# Patient Record
Sex: MALE | Race: WHITE
[De-identification: names, ages, dates, MRNs, and addresses within clinical notes are randomized per-mention and may not be internally consistent; named-entity substitution may affect disease eponyms.]

---

## 2017-04-15 ENCOUNTER — HOSPITAL ENCOUNTER (EMERGENCY)
Dept: HOSPITAL 80 - FED | Age: 22
Discharge: HOME | End: 2017-04-15
Payer: COMMERCIAL

## 2017-04-15 VITALS
SYSTOLIC BLOOD PRESSURE: 147 MMHG | OXYGEN SATURATION: 92 % | TEMPERATURE: 99.3 F | DIASTOLIC BLOOD PRESSURE: 78 MMHG | HEART RATE: 105 BPM

## 2017-04-15 VITALS — RESPIRATION RATE: 18 BRPM
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
## 2017-04-15 DIAGNOSIS — S83.014A: Primary | ICD-10-CM

## 2017-04-15 PROCEDURE — 0QSDXZZ REPOSITION RIGHT PATELLA, EXTERNAL APPROACH: ICD-10-PCS | Performed by: EMERGENCY MEDICINE

## 2017-04-15 PROCEDURE — L1830 KO IMMOB CANVAS LONG PRE OTS: HCPCS

## 2017-04-15 NOTE — EDPHY
H & P


Stated Complaint: Right Knee Dislocation


Time Seen by Provider: 04/15/17 00:15


HPI/ROS: 





Chief Complaint:  Right knee pain





HPI:  22-year-old male was at a club dancing tonight when he twisted his leg 

and felt a pop in his right knee with severe pain. He noticed significant 

deformity and.  That his kneecap was out of place.  He has been unable to walk 

since.  He has been drinking several beers this evening.  Last was about 45 

minutes prior to arrival.  Patient also last ate about 4 hours ago.  Denies 

prior injuries.  Did not fall.  Did not hit his head.





ROS:  10 point Review of Systems is negative except as noted in the HPI.





PMH:  None


Medications:  None


Allergies:  None





Social History: No smoking, occasional alcohol,  no recreational drug use





Family History: non-contributory





Physical Exam:


Gen: Awake, Alert, No Distress


HEENT:  


     Nose: no rhinorrhea


     Eyes: PERRLA, EOMI


     Mouth: Moist mucosa 


Neck: Supple, no JVD


Chest: nontender, lungs clear to auscultation


Heart: S1, S2 normal, no murmur


Abd: Soft, non-tender, no guarding


Back: no CVA tenderness, no midline tenderness 


Ext: no edema, right knee is held in flexion.  There is a marked deformity of 

his patella consistent with patellar dislocation.  DP and PT pulses are normal.


Skin: no rash


Neuro: CN II-XII intact, Sensation grossly intact, Strength 5/5 in bilateral 

upper and lower extremities








- Personal History


Current Tetanus Diphtheria and Acellular Pertussis (TDAP): Yes





- Medical/Surgical History


Hx Asthma: No


Hx Chronic Respiratory Disease: No


Hx Diabetes: No


Hx Cardiac Disease: No


Hx Renal Disease: No


Hx Cirrhosis: No


Hx Alcoholism: No


Hx HIV/AIDS: No


Hx Splenectomy or Spleen Trauma: No


Other PMH: adhd,pneumonia, DEPRESSION, anxiety, bipolar





- Social History


Smoking Status: Never smoked


Constitutional: 


 Initial Vital Signs











Temperature (C)  36.7 C   04/15/17 00:10


 


Heart Rate  87   04/15/17 00:10


 


Respiratory Rate  20   04/15/17 00:10


 


Blood Pressure  122/87 H  04/15/17 00:10


 


O2 Sat (%)  97   04/15/17 00:10








 











O2 Delivery Mode               Room Air














Allergies/Adverse Reactions: 


 





No Known Allergies Allergy (Unverified 02/09/16 17:10)


 








Home Medications: 














 Medication  Instructions  Recorded


 


buPROPion XL [Wellbutrin 150mg XL] 300 mg PO DAILY 06/26/16


 


traZODone [traZODONE 100MG (*)] 100 mg PO HS 06/26/16


 


Dextroamphetamine Sulfate 10 mg PO TID 06/27/16





[Dexedrine]  


 


PARoxetine CR [Paxil Cr 25mg (*)] 25 mg PO DAILY 06/27/16


 


PARoxetine HCL [Paxil Cr] 12.5 mg PO DAILY 06/27/16














Medical Decision Making


Procedures: 





Procedure:  Procedural sedation.





A pre-sedation evaluation was completed on the patient at 0100.  Patient is an 

appropriate candidate for procedural sedation.  The risks of the sedation were 

discussed with the patient.  A time out was completed.  The patient was sedated 

with ketamine, 100 mg.  The patient was monitored with continuous pulse 

oximetry and EKG monitor.  There were no complications and no significant 

hypoxemia.  I remained at the bedside for the sedation.  The total time I spent 

in the procedural sedation was 20 minutes.





Procedure:  Dislocation reduction.





The dislocation of the right patella was reduced using leg extension and medial 

pressure technique without complications.  Post reduction the patient's 

neurovascular exam is normal.Post reduction x-ray demonstrates reduction of the 

joint to the anatomic position.


The procedure was performed by myself.








ED Course/Re-evaluation: 





Patient tolerated sedation without any complications.  Postreduction x-rays 

revealed no abnormalities.  He was placed in a knee immobilizer and given 

crutches and instructions to follow up with Orthopedics next week.





- Data Points


Medications Given: 


 








Discontinued Medications





Fentanyl (Sublimaze)  100 mcg IVP EDNOW ONE


   Stop: 04/15/17 00:33


   Last Admin: 04/15/17 00:38 Dose:  100 mcg


Ondansetron HCl (Zofran)  4 mg IVP EDNOW ONE


   Stop: 04/15/17 00:33


   Last Admin: 04/15/17 00:39 Dose:  4 mg








Departure





- Departure


Disposition: Home, Routine, Self-Care


Clinical Impression: 


 Patellar dislocation





Condition: Good


Instructions:  Patellar Dislocation (ED), Knee Immobilizer (ED), Crutch 

Instructions (ED)


Additional Instructions: 


Follow up with Orthopedics in 3-4 days for re-evaluation.


Return to the emergency depart for increasing pain, numbness, tingling, or any 

other concerns.


Referrals: 


Patient,NotPresent [Unknown] - As per Instructions


Herbert Martinez MD [Medical Doctor] - As per Instructions

## 2017-09-09 ENCOUNTER — HOSPITAL ENCOUNTER (EMERGENCY)
Dept: HOSPITAL 80 - FED | Age: 22
Discharge: HOME | End: 2017-09-09
Payer: COMMERCIAL

## 2017-09-09 VITALS
RESPIRATION RATE: 16 BRPM | TEMPERATURE: 97.9 F | DIASTOLIC BLOOD PRESSURE: 96 MMHG | SYSTOLIC BLOOD PRESSURE: 158 MMHG | HEART RATE: 118 BPM

## 2017-09-09 VITALS — OXYGEN SATURATION: 97 %

## 2017-09-09 DIAGNOSIS — L03.011: Primary | ICD-10-CM

## 2017-09-09 NOTE — EDPHY
H & P


Stated Complaint: right pinky infection- injury on 9/5/17


Time Seen by Provider: 09/09/17 21:38


HPI/ROS: 





Chief complaint:  Right pinky finger infection





History of present illness:  This is a 22-year-old male who presents to the 

emergency department concerned he has a right pinky finger injury.  Patient 

states approximately 3 days ago he tripped and fell and cut the pad of his 

pinky finger.  He cleaned off and put Neosporin on it.  However now she is 

developing some pustular discharge on the wound.  He is concerned is becoming 

infected.  He denies actual pain.  He is still flex and extend the finger well.

  No abnormal coolness or paresthesias in the finger.  No other injuries 

reported.  His tetanus is up-to-date.





- Personal History


Current Tetanus Diphtheria and Acellular Pertussis (TDAP): Yes





- Medical/Surgical History


Hx Asthma: No


Hx Chronic Respiratory Disease: No


Hx Diabetes: No


Hx Cardiac Disease: No


Hx Renal Disease: No


Hx Cirrhosis: No


Hx Alcoholism: No


Hx HIV/AIDS: No


Hx Splenectomy or Spleen Trauma: No


Other PMH: adhd,pneumonia, DEPRESSION, anxiety, bipolar





- Social History


Smoking Status: Never smoked





- Physical Exam


Exam: 





General:  Alert, nontoxic


Skin:  There is an abrasion to the right 5th finger.  There is pustular 

discharge on top of the wound.  However the underlying tissue is without 

erythema or edema.  There is no surrounding erythema or edema.  No red 

streaking up the finger.


Musculoskeletal:  He is flexing and extending the right 5th finger in the DIP, 

PIP and MCP joint well.


Vascular:  Capillary refill brisk in the right 5th finger


Neurologic:  Sensation intact in the right 5th finger


Constitutional: 


 Initial Vital Signs











Temperature (C)  37.6 C   09/09/17 21:28


 


Heart Rate  130 H  09/09/17 21:28


 


Respiratory Rate  20   09/09/17 21:28


 


Blood Pressure  161/104 H  09/09/17 21:28


 


O2 Sat (%)  97   09/09/17 21:28








 











O2 Delivery Mode               Room Air














Allergies/Adverse Reactions: 


 





No Known Allergies Allergy (Unverified 09/09/17 21:27)


 








Home Medications: 














 Medication  Instructions  Recorded


 


buPROPion XL [Wellbutrin 150mg XL] 300 mg PO DAILY 06/26/16


 


traZODone [traZODONE 100MG (*)] 100 mg PO HS 06/26/16


 


Dextroamphetamine Sulfate 10 mg PO TID 06/27/16





[Dexedrine]  


 


PARoxetine CR [Paxil Cr 25mg (*)] 25 mg PO DAILY 06/27/16


 


PARoxetine HCL [Paxil Cr] 12.5 mg PO DAILY 06/27/16


 


Cephalexin [Keflex] 500 mg PO QID 6 Days  cap 09/09/17














Medical Decision Making


ED Course/Re-evaluation: 





Patient seen under the supervision of my secondary supervising physician Dr. Laughlin McCollester.  Patient presents to the emergency department for 

evaluation of a wound to his left 5th finger which he is concerned is getting 

infected.  It does appear to be a superficial wound. It has been cleaned and 

dressed.  I have discussed topical care.  I will place him on a course of 

antibiotic to ensure it does not progress.  Home care is discussed.  Return 

precautions are given.  Patient voiced understanding and agreement with plan.


Differential Diagnosis: 





Included but not limited to superficial infection, cellulitis, abscess, 

infectious tenosynovitis, lymphangitis





- Data Points


Medications Given: 


 








Discontinued Medications





Cephalexin (Keflex 500 Mg Prepack#4)  [facility]


   PRN Reason: Protocol


   Stop: 09/09/17 22:09


   Last Admin: 09/09/17 22:20 Dose:  1 btl








Departure





- Departure


Disposition: Home, Routine, Self-Care


Clinical Impression: 


Cellulitis, finger


Qualifiers:


 Laterality: right Qualified Code(s): L03.011 - Cellulitis of right finger





Condition: Good


Instructions:  Cephalexin (By mouth), Cellulitis (ED), Acute Wounds (ED)


Additional Instructions: 


Follow-up with a primary care doctor for recheck





Take antibiotics as prescribed until finished even if feeling better





Keep wound clean with soap and water and apply Neosporin 2-3 times daily





If symptoms worsen or new symptoms develop return to the emergency room for 

recheck


Referrals: 


NONE *PRIMARY CARE P,. [Primary Care Provider] - As per Instructions


Anabella Prado MD [[facility] Primary Care Provider] - As per Instructions


Prescriptions: 


Cephalexin [Keflex] 500 mg PO QID 6 Days  cap

## 2018-04-16 ENCOUNTER — HOSPITAL ENCOUNTER (OUTPATIENT)
Dept: HOSPITAL 80 - FIMAGING | Age: 23
Discharge: HOME | End: 2018-04-16
Attending: INTERNAL MEDICINE
Payer: COMMERCIAL

## 2018-04-16 DIAGNOSIS — A69.22: Primary | ICD-10-CM

## 2018-04-16 DIAGNOSIS — Z79.2: ICD-10-CM

## 2018-04-16 PROCEDURE — 36569 INSJ PICC 5 YR+ W/O IMAGING: CPT

## 2018-04-16 PROCEDURE — 77001 FLUOROGUIDE FOR VEIN DEVICE: CPT

## 2018-04-16 PROCEDURE — C1751 CATH, INF, PER/CENT/MIDLINE: HCPCS

## 2018-04-16 PROCEDURE — 02HV33Z INSERTION OF INFUSION DEVICE INTO SUPERIOR VENA CAVA, PERCUTANEOUS APPROACH: ICD-10-PCS

## 2018-06-09 ENCOUNTER — HOSPITAL ENCOUNTER (EMERGENCY)
Dept: HOSPITAL 80 - FED | Age: 23
Discharge: HOME | End: 2018-06-09
Payer: COMMERCIAL

## 2018-06-09 VITALS — SYSTOLIC BLOOD PRESSURE: 136 MMHG | DIASTOLIC BLOOD PRESSURE: 77 MMHG

## 2018-06-09 DIAGNOSIS — S01.01XA: Primary | ICD-10-CM

## 2018-06-09 DIAGNOSIS — F10.920: ICD-10-CM
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
## 2018-06-09 PROCEDURE — 0HQ0XZZ REPAIR SCALP SKIN, EXTERNAL APPROACH: ICD-10-PCS | Performed by: PHYSICIAN ASSISTANT
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
